# Patient Record
Sex: FEMALE | Race: WHITE | NOT HISPANIC OR LATINO
[De-identification: names, ages, dates, MRNs, and addresses within clinical notes are randomized per-mention and may not be internally consistent; named-entity substitution may affect disease eponyms.]

---

## 2020-01-23 ENCOUNTER — APPOINTMENT (OUTPATIENT)
Dept: ORTHOPEDIC SURGERY | Facility: CLINIC | Age: 30
End: 2020-01-23
Payer: COMMERCIAL

## 2020-01-23 VITALS — WEIGHT: 125 LBS | HEIGHT: 66 IN | BODY MASS INDEX: 20.09 KG/M2

## 2020-01-23 DIAGNOSIS — M54.2 CERVICALGIA: ICD-10-CM

## 2020-01-23 DIAGNOSIS — Z78.9 OTHER SPECIFIED HEALTH STATUS: ICD-10-CM

## 2020-01-23 DIAGNOSIS — Z86.39 PERSONAL HISTORY OF OTHER ENDOCRINE, NUTRITIONAL AND METABOLIC DISEASE: ICD-10-CM

## 2020-01-23 PROBLEM — Z00.00 ENCOUNTER FOR PREVENTIVE HEALTH EXAMINATION: Status: ACTIVE | Noted: 2020-01-23

## 2020-01-23 PROCEDURE — 72040 X-RAY EXAM NECK SPINE 2-3 VW: CPT

## 2020-01-23 PROCEDURE — 99215 OFFICE O/P EST HI 40 MIN: CPT

## 2020-01-23 NOTE — PHYSICAL EXAM
[de-identified] : General: No acute distress, conversant, well-nourished.\par Head: Normocephalic, atraumatic\par Neck: trachea midline, FROM\par Heart: normotensive and normal rate and rhythm\par Lungs: No labored breathing\par Skin: No abrasions, no rashes, no edema\par Psych: Alert and oriented to person, place and time\par Extremities: no peripheral edema or digital cyanosis\par Gait: Normal gait. Can perform tandem gait.  \par Vascular: warm and well perfused distally, palpable distal pulses\par \par MSK:\par Cervical Spine: \par No tenderness to palpation.  No step-off, no deformity.\par \par NEURO:\par Sensation \par          Left           \par C5     2/2               \par C6     2/2               \par C7     2/2               \par C8     2/2              \par T1     2/2             \par \par          Right         \par C5     2/2               \par C6     2/2               \par C7     2/2               \par C8     2/2              \par T1     2/2      \par \par Motor: \par                                                Left             \par C5 (deltoid abduction)             5/5               \par C6 (biceps flexion)                   5/5                \par C7 (triceps extension)             5/5               \par C8 (finger flexion)                     5/5               \par T1 (interosseous)                     5/5           \par \par                                                Right           \par C5 (deltoid abduction)             5/5               \par C6 (biceps flexion)                   5/5                \par C7 (triceps extension)             5/5               \par C8 (finger flexion)                     5/5               \par T1 (interosseous)                     5/5                     \par \par Sensation \par Left L2  -  2/2            \par Left L3  -  2/2\par Left L4  -  2/2\par Left L5  -  2/2\par Left S1  -  2/2\par \par Right L2  -  2/2            \par Right L3  -  2/2\par Right L4  -  2/2\par Right L5  -  2/2\par Right S1  -  2/2\par \par Motor: \par Left L2 (hip flexion)                            5/5                \par Left L3 (knee extension)                   5/5                \par Left L4 (ankle dorsiflexion)                 5/5                \par Left L5 (long toe extensor)                5/5                \par Left S1 (ankle plantar flexion)           5/5\par \par Right L2 (hip flexion)                            5/5                \par Right L3 (knee extension)                   5/5                \par Right L4 (ankle dorsiflexion)                 5/5                \par Right L5 (long toe extensor)                5/5                \par Right S1 (ankle plantar flexion)           5/5\par \par Reflexes: Normal and symmetric\par Negative Spurling’s test.  Negative Real’s reflex.  \par Negative clonus.  Down-going Babinski.\par \par  [de-identified] : Cervical radiographs obtained in the office today shows no fracture or dislocation.  There is straightening of her normal lordotic curvature of the spine.

## 2020-01-23 NOTE — HISTORY OF PRESENT ILLNESS
[de-identified] : 29 year old female with right sided neck and upper back pain in the area of her trapezius.  She has had three episodes of pain in this area which are self-limiting lasting only a few days.  She says the pain improves with Advil and tumeric pills.  She says the pain will sometimes bother her during workouts or prolonged sitting.  She says her latest episodes has mostly resolved.  She denies any radicular pain, numbness, weakness, balance problems, urinary retention, or paresthesias.

## 2020-01-23 NOTE — ASSESSMENT
[FreeTextEntry1] : 29 year old female with episodes of neck pain and pain in her right trapezius.  They are self-limiting.  She denies any radicular pain and is neurologically intact.  She was given a referral for physical therapy.  She can take NSAIDs as needed.  She knows to call with any questions or concerns or if her symptoms acutely worsen.

## 2020-01-29 ENCOUNTER — TRANSCRIPTION ENCOUNTER (OUTPATIENT)
Age: 30
End: 2020-01-29